# Patient Record
Sex: FEMALE | Race: WHITE | NOT HISPANIC OR LATINO | Employment: OTHER
[De-identification: names, ages, dates, MRNs, and addresses within clinical notes are randomized per-mention and may not be internally consistent; named-entity substitution may affect disease eponyms.]

---

## 2019-12-03 ENCOUNTER — RECORDS - HEALTHEAST (OUTPATIENT)
Dept: ADMINISTRATIVE | Facility: OTHER | Age: 59
End: 2019-12-03

## 2019-12-12 ENCOUNTER — RECORDS - HEALTHEAST (OUTPATIENT)
Dept: BONE DENSITY | Facility: CLINIC | Age: 59
End: 2019-12-12

## 2019-12-12 ENCOUNTER — RECORDS - HEALTHEAST (OUTPATIENT)
Dept: MAMMOGRAPHY | Facility: CLINIC | Age: 59
End: 2019-12-12

## 2019-12-12 ENCOUNTER — RECORDS - HEALTHEAST (OUTPATIENT)
Dept: ADMINISTRATIVE | Facility: OTHER | Age: 59
End: 2019-12-12

## 2019-12-12 DIAGNOSIS — Z78.0 ASYMPTOMATIC MENOPAUSAL STATE: ICD-10-CM

## 2019-12-12 DIAGNOSIS — Z12.31 ENCOUNTER FOR SCREENING MAMMOGRAM FOR MALIGNANT NEOPLASM OF BREAST: ICD-10-CM

## 2019-12-17 ENCOUNTER — RECORDS - HEALTHEAST (OUTPATIENT)
Dept: RADIOLOGY | Facility: CLINIC | Age: 59
End: 2019-12-17

## 2023-06-03 ENCOUNTER — APPOINTMENT (OUTPATIENT)
Dept: ULTRASOUND IMAGING | Facility: HOSPITAL | Age: 63
End: 2023-06-03
Attending: INTERNAL MEDICINE
Payer: COMMERCIAL

## 2023-06-03 ENCOUNTER — HOSPITAL ENCOUNTER (INPATIENT)
Facility: HOSPITAL | Age: 63
LOS: 2 days | Discharge: HOME | End: 2023-06-05
Attending: EMERGENCY MEDICINE | Admitting: INTERNAL MEDICINE
Payer: COMMERCIAL

## 2023-06-03 DIAGNOSIS — N61.0 CELLULITIS OF RIGHT BREAST: ICD-10-CM

## 2023-06-03 LAB
ANION GAP SERPL CALCULATED.3IONS-SCNC: 10 MMOL/L (ref 7–15)
BASOPHILS # BLD AUTO: 0 10E3/UL (ref 0–0.2)
BASOPHILS NFR BLD AUTO: 1 %
BUN SERPL-MCNC: 13.4 MG/DL (ref 8–23)
CALCIUM SERPL-MCNC: 9.5 MG/DL (ref 8.8–10.2)
CHLORIDE SERPL-SCNC: 101 MMOL/L (ref 98–107)
CREAT SERPL-MCNC: 0.56 MG/DL (ref 0.51–0.95)
DEPRECATED HCO3 PLAS-SCNC: 28 MMOL/L (ref 22–29)
EOSINOPHIL # BLD AUTO: 0 10E3/UL (ref 0–0.7)
EOSINOPHIL NFR BLD AUTO: 1 %
ERYTHROCYTE [DISTWIDTH] IN BLOOD BY AUTOMATED COUNT: 13.1 % (ref 10–15)
GFR SERPL CREATININE-BSD FRML MDRD: >90 ML/MIN/1.73M2
GLUCOSE SERPL-MCNC: 96 MG/DL (ref 70–99)
HCT VFR BLD AUTO: 34.8 % (ref 35–47)
HGB BLD-MCNC: 11.6 G/DL (ref 11.7–15.7)
IMM GRANULOCYTES # BLD: 0 10E3/UL
IMM GRANULOCYTES NFR BLD: 0 %
LACTATE SERPL-SCNC: 1.3 MMOL/L (ref 0.7–2)
LYMPHOCYTES # BLD AUTO: 0.9 10E3/UL (ref 0.8–5.3)
LYMPHOCYTES NFR BLD AUTO: 20 %
MAGNESIUM SERPL-MCNC: 2 MG/DL (ref 1.7–2.3)
MCH RBC QN AUTO: 31.8 PG (ref 26.5–33)
MCHC RBC AUTO-ENTMCNC: 33.3 G/DL (ref 31.5–36.5)
MCV RBC AUTO: 95 FL (ref 78–100)
MONOCYTES # BLD AUTO: 0.2 10E3/UL (ref 0–1.3)
MONOCYTES NFR BLD AUTO: 5 %
NEUTROPHILS # BLD AUTO: 3.2 10E3/UL (ref 1.6–8.3)
NEUTROPHILS NFR BLD AUTO: 73 %
NRBC # BLD AUTO: 0 10E3/UL
NRBC BLD AUTO-RTO: 0 /100
PLATELET # BLD AUTO: 238 10E3/UL (ref 150–450)
POTASSIUM SERPL-SCNC: 4.2 MMOL/L (ref 3.4–5.3)
RBC # BLD AUTO: 3.65 10E6/UL (ref 3.8–5.2)
SODIUM SERPL-SCNC: 139 MMOL/L (ref 136–145)
WBC # BLD AUTO: 4.4 10E3/UL (ref 4–11)

## 2023-06-03 PROCEDURE — 85014 HEMATOCRIT: CPT | Performed by: EMERGENCY MEDICINE

## 2023-06-03 PROCEDURE — G0378 HOSPITAL OBSERVATION PER HR: HCPCS

## 2023-06-03 PROCEDURE — 120N000001 HC R&B MED SURG/OB

## 2023-06-03 PROCEDURE — 250N000011 HC RX IP 250 OP 636: Performed by: EMERGENCY MEDICINE

## 2023-06-03 PROCEDURE — 96375 TX/PRO/DX INJ NEW DRUG ADDON: CPT

## 2023-06-03 PROCEDURE — 96374 THER/PROPH/DIAG INJ IV PUSH: CPT

## 2023-06-03 PROCEDURE — 80048 BASIC METABOLIC PNL TOTAL CA: CPT | Performed by: EMERGENCY MEDICINE

## 2023-06-03 PROCEDURE — 258N000003 HC RX IP 258 OP 636: Performed by: EMERGENCY MEDICINE

## 2023-06-03 PROCEDURE — 83735 ASSAY OF MAGNESIUM: CPT | Performed by: EMERGENCY MEDICINE

## 2023-06-03 PROCEDURE — 76641 ULTRASOUND BREAST COMPLETE: CPT | Mod: RT

## 2023-06-03 PROCEDURE — 87040 BLOOD CULTURE FOR BACTERIA: CPT | Performed by: EMERGENCY MEDICINE

## 2023-06-03 PROCEDURE — 83605 ASSAY OF LACTIC ACID: CPT | Performed by: EMERGENCY MEDICINE

## 2023-06-03 PROCEDURE — 250N000013 HC RX MED GY IP 250 OP 250 PS 637: Performed by: INTERNAL MEDICINE

## 2023-06-03 PROCEDURE — 99222 1ST HOSP IP/OBS MODERATE 55: CPT | Performed by: INTERNAL MEDICINE

## 2023-06-03 PROCEDURE — 96376 TX/PRO/DX INJ SAME DRUG ADON: CPT

## 2023-06-03 PROCEDURE — 99285 EMERGENCY DEPT VISIT HI MDM: CPT | Mod: 25

## 2023-06-03 PROCEDURE — 36415 COLL VENOUS BLD VENIPUNCTURE: CPT | Performed by: EMERGENCY MEDICINE

## 2023-06-03 PROCEDURE — 250N000011 HC RX IP 250 OP 636: Performed by: INTERNAL MEDICINE

## 2023-06-03 RX ORDER — SIMVASTATIN 10 MG
40 TABLET ORAL AT BEDTIME
Status: DISCONTINUED | OUTPATIENT
Start: 2023-06-03 | End: 2023-06-05 | Stop reason: HOSPADM

## 2023-06-03 RX ORDER — ONDANSETRON 4 MG/1
4 TABLET, FILM COATED ORAL EVERY 6 HOURS PRN
COMMUNITY
Start: 2023-02-06

## 2023-06-03 RX ORDER — IBUPROFEN 200 MG
400 TABLET ORAL EVERY 6 HOURS PRN
COMMUNITY
Start: 2022-06-03

## 2023-06-03 RX ORDER — CEFAZOLIN SODIUM 1 G/50ML
1250 SOLUTION INTRAVENOUS ONCE
Status: COMPLETED | OUTPATIENT
Start: 2023-06-03 | End: 2023-06-03

## 2023-06-03 RX ORDER — LIDOCAINE 40 MG/G
CREAM TOPICAL
Status: DISCONTINUED | OUTPATIENT
Start: 2023-06-03 | End: 2023-06-05 | Stop reason: HOSPADM

## 2023-06-03 RX ORDER — PIPERACILLIN SODIUM, TAZOBACTAM SODIUM 3; .375 G/15ML; G/15ML
3.38 INJECTION, POWDER, LYOPHILIZED, FOR SOLUTION INTRAVENOUS ONCE
Status: COMPLETED | OUTPATIENT
Start: 2023-06-03 | End: 2023-06-03

## 2023-06-03 RX ORDER — AMOXICILLIN 250 MG
2 CAPSULE ORAL 2 TIMES DAILY PRN
Status: DISCONTINUED | OUTPATIENT
Start: 2023-06-03 | End: 2023-06-05 | Stop reason: HOSPADM

## 2023-06-03 RX ORDER — PIPERACILLIN SODIUM, TAZOBACTAM SODIUM 3; .375 G/15ML; G/15ML
3.38 INJECTION, POWDER, LYOPHILIZED, FOR SOLUTION INTRAVENOUS EVERY 8 HOURS
Status: DISCONTINUED | OUTPATIENT
Start: 2023-06-03 | End: 2023-06-05 | Stop reason: HOSPADM

## 2023-06-03 RX ORDER — ACETAMINOPHEN 325 MG/1
650 TABLET ORAL EVERY 4 HOURS PRN
Status: DISCONTINUED | OUTPATIENT
Start: 2023-06-03 | End: 2023-06-05 | Stop reason: HOSPADM

## 2023-06-03 RX ORDER — VANCOMYCIN HYDROCHLORIDE 1 G/200ML
1000 INJECTION, SOLUTION INTRAVENOUS EVERY 12 HOURS
Status: DISCONTINUED | OUTPATIENT
Start: 2023-06-03 | End: 2023-06-05 | Stop reason: HOSPADM

## 2023-06-03 RX ORDER — ACETAMINOPHEN 500 MG
1000 TABLET ORAL EVERY 6 HOURS PRN
COMMUNITY

## 2023-06-03 RX ORDER — CELECOXIB 100 MG/1
100 CAPSULE ORAL DAILY
COMMUNITY
Start: 2023-03-21

## 2023-06-03 RX ORDER — TRAZODONE HYDROCHLORIDE 50 MG/1
1-2 TABLET, FILM COATED ORAL
COMMUNITY
Start: 2023-02-27

## 2023-06-03 RX ORDER — POLYETHYLENE GLYCOL 3350 17 G/17G
17 POWDER, FOR SOLUTION ORAL DAILY PRN
Status: DISCONTINUED | OUTPATIENT
Start: 2023-06-03 | End: 2023-06-05 | Stop reason: HOSPADM

## 2023-06-03 RX ORDER — SIMVASTATIN 40 MG
40 TABLET ORAL AT BEDTIME
COMMUNITY
Start: 2023-05-04

## 2023-06-03 RX ORDER — CELECOXIB 100 MG/1
100 CAPSULE ORAL DAILY
Status: DISCONTINUED | OUTPATIENT
Start: 2023-06-03 | End: 2023-06-05 | Stop reason: HOSPADM

## 2023-06-03 RX ORDER — TRAZODONE HYDROCHLORIDE 50 MG/1
100 TABLET, FILM COATED ORAL
Status: DISCONTINUED | OUTPATIENT
Start: 2023-06-03 | End: 2023-06-05 | Stop reason: HOSPADM

## 2023-06-03 RX ORDER — AMOXICILLIN 250 MG
1 CAPSULE ORAL 2 TIMES DAILY PRN
Status: DISCONTINUED | OUTPATIENT
Start: 2023-06-03 | End: 2023-06-05 | Stop reason: HOSPADM

## 2023-06-03 RX ADMIN — VANCOMYCIN HYDROCHLORIDE 1250 MG: 5 INJECTION, POWDER, LYOPHILIZED, FOR SOLUTION INTRAVENOUS at 12:29

## 2023-06-03 RX ADMIN — VANCOMYCIN HYDROCHLORIDE 1000 MG: 1 INJECTION, SOLUTION INTRAVENOUS at 23:03

## 2023-06-03 RX ADMIN — ACETAMINOPHEN 650 MG: 325 TABLET ORAL at 13:30

## 2023-06-03 RX ADMIN — PIPERACILLIN AND TAZOBACTAM 3.38 G: 3; .375 INJECTION, POWDER, LYOPHILIZED, FOR SOLUTION INTRAVENOUS at 11:52

## 2023-06-03 RX ADMIN — SIMVASTATIN 40 MG: 40 TABLET, FILM COATED ORAL at 21:36

## 2023-06-03 RX ADMIN — PIPERACILLIN AND TAZOBACTAM 3.38 G: 3; .375 INJECTION, POWDER, LYOPHILIZED, FOR SOLUTION INTRAVENOUS at 18:06

## 2023-06-03 RX ADMIN — CELECOXIB 100 MG: 100 CAPSULE ORAL at 18:06

## 2023-06-03 ASSESSMENT — ACTIVITIES OF DAILY LIVING (ADL)
ADLS_ACUITY_SCORE: 35
ADLS_ACUITY_SCORE: 33
ADLS_ACUITY_SCORE: 35
ADLS_ACUITY_SCORE: 35

## 2023-06-03 NOTE — PHARMACY-ADMISSION MEDICATION HISTORY
Pharmacy Intern Admission Medication History    Admission medication history is complete. The information provided in this note is only as accurate as the sources available at the time of the update.    Medication reconciliation/reorder completed by provider prior to medication history? No    Information Source(s): Patient and CareEverywhere/SureScripts via in-person    Changes made to PTA medication list:    Added: All of list is new    Deleted: None    Changed: None    Medication Affordability:  Not including over the counter (OTC) medications, was there a time in the past 3 months when you did not take your medications as prescribed because of cost?: No    Allergies reviewed with patient and updates made in EHR: yes    Medication History Completed By: Rafael Sanchez 6/3/2023 11:58 AM    Prior to Admission medications    Medication Sig Last Dose Taking? Auth Provider Long Term End Date   acetaminophen (TYLENOL) 500 MG tablet Take 1,000 mg by mouth every 6 hours as needed for pain 6/2/2023 at pm Yes Unknown, Entered By History     amoxicillin-clavulanate (AUGMENTIN) 875-125 MG tablet Take 1 tablet by mouth 2 times daily 6/3/2023 at am Yes Unknown, Entered By History     celecoxib (CELEBREX) 100 MG capsule Take 100 mg by mouth daily 6/1/2023 at am Yes Unknown, Entered By History Yes    ibuprofen (ADVIL/MOTRIN) 200 MG tablet Take 400 mg by mouth every 6 hours as needed 6/2/2023 at pm Yes Unknown, Entered By History     ondansetron (ZOFRAN) 4 MG tablet Take 4 mg by mouth every 6 hours as needed 6/2/2023 at am Yes Unknown, Entered By History     simvastatin (ZOCOR) 40 MG tablet Take 40 mg by mouth At Bedtime 6/1/2023 at pm Yes Unknown, Entered By History Yes    traZODone (DESYREL) 50 MG tablet Take 1-2 tablets by mouth nightly as needed 6/1/2023 at hs Yes Unknown, Entered By History Yes

## 2023-06-03 NOTE — ED PROVIDER NOTES
EMERGENCY DEPARTMENT ENCOUNTER      NAME: Molly Peralta  AGE: 62 year old female  YOB: 1960  MRN: [id number]  EVALUATION DATE & TIME: 6/3/2023 10:22 AM    PCP: Carl Brannon    ED PROVIDER: Taylor Perera M.D.      Chief Complaint   Patient presents with     Generalized Body Aches     Breast Problem     FINAL IMPRESSION:  1. Cellulitis of right breast        ED COURSE & MEDICAL DECISION MAKING:    Pertinent Labs & Imaging studies reviewed. (See chart for details)  ED Course as of 06/03/23 1303   Sat Jun 03, 2023   1029 I reviewed the patient's office visit from yesterday.  She was seen for concern for breast infection and diagnosed with mastitis.  She was started on Augmentin twice a day for 10 days.   1036 Patient is a 62-year-old female who comes in today for evaluation of body aches and progressing erythema from the right breast.  She said a few days ago she had worn a sports bra that was too big and had a lot of shaving around her nipple.  She then developed some erythema to the medial upper right breast and was seen yesterday and started on Augmentin.  She had a total of 3 doses but now is getting erythema spread into her axilla and up into her neck.  She is having fevers and chills.  She is having body aches.  She is definitely having systemic symptoms with this infection so I think this likely represents some sepsis.  We will order vancomycin and Zosyn.  Given that this likely started from shaving I suspect that the organism is likely a skin organism.  I discussed the plan with the patient and she is in agreement.   1150 Spoke with Dr. Polanco with the hospitalist service who agrees with a [facility] inpatient admission for the cellulitis.     10:30 AM I met with and examined the patient.  11:48 AM Spoke with Dr. Villa, Hospitalist, regarding plan for admission.    Medical Decision Making    History:    Supplemental history from:  at bedside    External Record(s) reviewed: Review from  Clinic visit on 6/2/23    Work Up:    Emergent/Severe conditions considered and evaluated for: Breast abscess, cellulitis, sepsis, septic shock    I independently reviewed and interpreted none    In additional to work up documented, I considered the following work up: Considered ultrasound but given that the patient did not have any palpable fluctuance I did not feel it was necessary    Medications given that require intensive monitoring for toxicity: None    External consultation:    Discussion of management with another provider: Hospitalist    Complicating factors:    Care impacted by chronic illness: N/A    Care affected by social determinants of health: N/A    Disposition considerations: Admit    At the conclusion of the encounter I discussed  the results of all of the tests and the disposition with patient.   All questions were answered.  The patient acknowledged understanding and was involved in the decision making regarding the overall care plan.        MEDICATIONS GIVEN IN THE EMERGENCY:  Medications   vancomycin (VANCOCIN) 1,250 mg in sodium chloride 0.9 % 250 mL intermittent infusion (1,250 mg Intravenous $New Bag 6/3/23 1229)   acetaminophen (TYLENOL) tablet 650 mg (has no administration in time range)   oxyCODONE IR (ROXICODONE) half-tab 2.5-5 mg (has no administration in time range)   piperacillin-tazobactam (ZOSYN) 3.375 g vial to attach to  mL bag (has no administration in time range)   vancomycin (VANCOCIN) 1000 mg in dextrose 5% 200 mL PREMIX (has no administration in time range)   celecoxib (celeBREX) capsule 100 mg (has no administration in time range)   simvastatin (ZOCOR) tablet 40 mg (has no administration in time range)   traZODone (DESYREL) tablet 100 mg (has no administration in time range)   lidocaine 1 % 0.1-1 mL (has no administration in time range)   lidocaine (LMX4) cream (has no administration in time range)   sodium chloride (PF) 0.9% PF flush 3 mL (has no administration in  time range)   sodium chloride (PF) 0.9% PF flush 3 mL (has no administration in time range)   melatonin tablet 1 mg (has no administration in time range)   senna-docusate (SENOKOT-S/PERICOLACE) 8.6-50 MG per tablet 1 tablet (has no administration in time range)     Or   senna-docusate (SENOKOT-S/PERICOLACE) 8.6-50 MG per tablet 2 tablet (has no administration in time range)   polyethylene glycol (MIRALAX) Packet 17 g (has no administration in time range)   piperacillin-tazobactam (ZOSYN) 3.375 g vial to attach to  mL bag (3.375 g Intravenous $Given 6/3/23 1395)     =================================================================    HPI    Triage Note: She diagnosed with a breast infection yesterday and started on antibiotics. She feels it is spreading.     Patient information was obtained from: patient     Use of : N/A       Molly Peralta is a 62 year old female who presents to the ED via walk in for evaluation of a right breast infection.     Per chart review, the patient was seen at [facility] on 6/2/2023 for evaluation of a right breast infection. She declined breast imaging and decided to trial an antibiotic. She was started on Augmentin twice daily for 10 days.     The patient reports that she was diagnosed with a right breast infection yesterday and started on Augmentin. She took her third dose this morning. She believes the infection was caused from wearing an ill-fitting bra as she was gardening a few days ago. The patient states that the infection has spread to her back and neck. Also endorses a fever with Tmax 103, a headache, chills, and body aches. The patient has been taking acetaminophen and ibuprofen, but these have provided no relief.    She denies pain anywhere other than where the initial chaffing on her right breast occurred. No other concerns reported at this time.    PAST MEDICAL HISTORY:  History reviewed. No pertinent past medical history.    PAST  SURGICAL HISTORY:  History reviewed. No pertinent surgical history.    CURRENT MEDICATIONS:      Current Facility-Administered Medications:      acetaminophen (TYLENOL) tablet 650 mg, 650 mg, Oral, Q4H PRN, Dania Villa MD     celecoxib (celeBREX) capsule 100 mg, 100 mg, Oral, Daily, Dania Villa MD     lidocaine (LMX4) cream, , Topical, Q1H PRN, Dania Villa MD     lidocaine 1 % 0.1-1 mL, 0.1-1 mL, Other, Q1H PRN, Dania Villa MD     melatonin tablet 1 mg, 1 mg, Oral, At Bedtime PRN, Dania Villa MD     oxyCODONE IR (ROXICODONE) half-tab 2.5-5 mg, 2.5-5 mg, Oral, Q4H PRN, Dania Villa MD     piperacillin-tazobactam (ZOSYN) 3.375 g vial to attach to  mL bag, 3.375 g, Intravenous, Q8H, Dania Villa MD     polyethylene glycol (MIRALAX) Packet 17 g, 17 g, Oral, Daily PRN, Dania Villa MD     senna-docusate (SENOKOT-S/PERICOLACE) 8.6-50 MG per tablet 1 tablet, 1 tablet, Oral, BID PRN **OR** senna-docusate (SENOKOT-S/PERICOLACE) 8.6-50 MG per tablet 2 tablet, 2 tablet, Oral, BID PRN, Dania Villa MD     simvastatin (ZOCOR) tablet 40 mg, 40 mg, Oral, At Bedtime, Dania Villa MD     sodium chloride (PF) 0.9% PF flush 3 mL, 3 mL, Intracatheter, Q8H, Dania Villa MD     sodium chloride (PF) 0.9% PF flush 3 mL, 3 mL, Intracatheter, q1 min prn, Dania Villa MD     traZODone (DESYREL) tablet 100 mg, 100 mg, Oral, At Bedtime PRN, Dania Villa MD     vancomycin (VANCOCIN) 1,250 mg in sodium chloride 0.9 % 250 mL intermittent infusion, 1,250 mg, Intravenous, Once, Taylor Perera MD, 1,250 mg at 06/03/23 1229     vancomycin (VANCOCIN) 1000 mg in dextrose 5% 200 mL PREMIX, 1,000 mg, Intravenous, Q12H, Dania Villa MD    Current Outpatient Medications:      acetaminophen (TYLENOL) 500 MG tablet, Take 1,000 mg by mouth every 6 hours as needed for pain, Disp: , Rfl:      amoxicillin-clavulanate (AUGMENTIN) 875-125 MG tablet, Take 1 tablet by mouth 2 times daily X 10 days, Disp: , Rfl:       "celecoxib (CELEBREX) 100 MG capsule, Take 100 mg by mouth daily, Disp: , Rfl:      ibuprofen (ADVIL/MOTRIN) 200 MG tablet, Take 400 mg by mouth every 6 hours as needed, Disp: , Rfl:      ondansetron (ZOFRAN) 4 MG tablet, Take 4 mg by mouth every 6 hours as needed, Disp: , Rfl:      simvastatin (ZOCOR) 40 MG tablet, Take 40 mg by mouth At Bedtime, Disp: , Rfl:      traZODone (DESYREL) 50 MG tablet, Take 1-2 tablets by mouth nightly as needed, Disp: , Rfl:     ALLERGIES:  No Known Allergies    FAMILY HISTORY:  History reviewed. No pertinent family history.    SOCIAL HISTORY:   Social History     Socioeconomic History     Marital status:        PHYSICAL EXAM    VITAL SIGNS: BP (!) 146/69   Pulse 67   Temp 98.2  F (36.8  C) (Oral)   Resp 12   Ht 1.651 m (5' 5\")   Wt 55 kg (121 lb 4.1 oz)   SpO2 97%   BMI 20.18 kg/m     GENERAL: Awake, alert, answering questions appropriately.   SPEECH:  Easy to understand speech, Normal volume and randee  PULMONARY: No respiratory distress, Lungs clear to auscultation bilaterally  CARDIOVASCULAR: Regular rate and rhythm, Distal pulses present and normal.  ABDOMINAL: Soft, Nondistended, Nontender, No rebound or guarding, No palpable masses  EXTREMITIES: No lower extremity edema. Medial upper quadrant erythema of the right breast.  Tenderness to the area and tenderness to the nipple.  No palpable fluctuant mass.  Erythema into the right axilla up into right neck.  PSYCH: Normal mood and affect     LAB:  All pertinent labs reviewed and interpreted.  Results for orders placed or performed during the hospital encounter of 06/03/23   Basic metabolic panel   Result Value Ref Range    Sodium 139 136 - 145 mmol/L    Potassium 4.2 3.4 - 5.3 mmol/L    Chloride 101 98 - 107 mmol/L    Carbon Dioxide (CO2) 28 22 - 29 mmol/L    Anion Gap 10 7 - 15 mmol/L    Urea Nitrogen 13.4 8.0 - 23.0 mg/dL    Creatinine 0.56 0.51 - 0.95 mg/dL    Calcium 9.5 8.8 - 10.2 mg/dL    Glucose 96 70 - 99 " mg/dL    GFR Estimate >90 >60 mL/min/1.73m2   Lactic acid whole blood   Result Value Ref Range    Lactic Acid 1.3 0.7 - 2.0 mmol/L   Result Value Ref Range    Magnesium 2.0 1.7 - 2.3 mg/dL   CBC with platelets and differential   Result Value Ref Range    WBC Count 4.4 4.0 - 11.0 10e3/uL    RBC Count 3.65 (L) 3.80 - 5.20 10e6/uL    Hemoglobin 11.6 (L) 11.7 - 15.7 g/dL    Hematocrit 34.8 (L) 35.0 - 47.0 %    MCV 95 78 - 100 fL    MCH 31.8 26.5 - 33.0 pg    MCHC 33.3 31.5 - 36.5 g/dL    RDW 13.1 10.0 - 15.0 %    Platelet Count 238 150 - 450 10e3/uL    % Neutrophils 73 %    % Lymphocytes 20 %    % Monocytes 5 %    % Eosinophils 1 %    % Basophils 1 %    % Immature Granulocytes 0 %    NRBCs per 100 WBC 0 <1 /100    Absolute Neutrophils 3.2 1.6 - 8.3 10e3/uL    Absolute Lymphocytes 0.9 0.8 - 5.3 10e3/uL    Absolute Monocytes 0.2 0.0 - 1.3 10e3/uL    Absolute Eosinophils 0.0 0.0 - 0.7 10e3/uL    Absolute Basophils 0.0 0.0 - 0.2 10e3/uL    Absolute Immature Granulocytes 0.0 <=0.4 10e3/uL    Absolute NRBCs 0.0 10e3/uL       I, Dangelo Reyes , am serving as a scribe to document services personally performed by Dr. Perera based on my observation and the provider's statements to me. I, Taylor Perera MD attest that Dangelo Reyes is acting in a scribe capacity, has observed my performance of the services and has documented them in accordance with my direction.    Taylor Perera M.D.  Emergency Medicine  [facility] EMERGENCY DEPARTMENT  [facility] [facility] [address] [phone]  Dept: [phone]     Taylor Perera MD  06/03/23 0510

## 2023-06-03 NOTE — ED TRIAGE NOTES
She diagnosed with a breast infection yesterday and started on antibiotics. She feels it is spreading.

## 2023-06-03 NOTE — PHARMACY-VANCOMYCIN DOSING SERVICE
"Pharmacy Vancomycin Initial Note  Date of Service Shweta 3, 2023  Patient's  1960  62 year old, female    Indication: Skin and Soft Tissue Infection    Current estimated CrCl = Estimated Creatinine Clearance: 88.1 mL/min (based on SCr of 0.56 mg/dL).    Creatinine for last 3 days  6/3/2023: 10:55 AM Creatinine 0.56 mg/dL    Recent Vancomycin Level(s) for last 3 days  No results found for requested labs within last 3 days.      Vancomycin IV Administrations (past 72 hours)                   vancomycin (VANCOCIN) 1,250 mg in sodium chloride 0.9 % 250 mL intermittent infusion (mg) 1,250 mg New Bag 23 1229                Nephrotoxins and other renal medications (From now, onward)    Start     Dose/Rate Route Frequency Ordered Stop    23 2300  vancomycin (VANCOCIN) 1,250 mg in sodium chloride 0.9 % 250 mL intermittent infusion         1,250 mg  over 90 Minutes Intravenous EVERY 12 HOURS 23 1244      23 1800  piperacillin-tazobactam (ZOSYN) 3.375 g vial to attach to  mL bag        Note to Pharmacy: For [facility], [facility] and [facility]: For Zosyn-naive patients, use the \"Zosyn initial dose + extended infusion\" order panel.    3.375 g  over 240 Minutes Intravenous EVERY 8 HOURS 23 1244      23 1100  vancomycin (VANCOCIN) 1,250 mg in sodium chloride 0.9 % 250 mL intermittent infusion         1,250 mg  over 90 Minutes Intravenous ONCE 23 1050            Contrast Orders - past 72 hours (72h ago, onward)    None          [employer] Prediction of Planned Initial Vancomycin Regimen  Loading dose: N/A  Regimen: 1000 mg IV every 12 hours.  Start time: 00:29 on 2023  Exposure target: AUC24 (range)400-600 mg/L.hr   AUC24,ss: 565 mg/L.hr  Probability of AUC24 > 400: 82 %  Ctrough,ss: 16.9 mg/L  Probability of Ctrough,ss > 20: 37 %  Probability of nephrotoxicity (Lodise SAMUEL ): 13 %        Plan:  1. Start vancomycin  1000 mg IV q12h.   2. Vancomycin monitoring method: AUC  3. Vancomycin " therapeutic monitoring goal: 400-600 mg*h/L  4. Pharmacy will check vancomycin levels as appropriate in 1-3 Days.    5. Serum creatinine levels will be ordered daily for the first week of therapy and at least twice weekly for subsequent weeks.      Khadijah Fowler, PharmD, BCPS 06/03/23 12:48 PM

## 2023-06-03 NOTE — H&P
[facility]    History and Physical - Hospitalist Service       Date of Admission:  6/3/2023    Assessment & Plan      Molly Peralta is a 62 year old female admitted on 6/3/2023 for right breast mastitis and chest wall cellulitis.    Right breast mastitis and chest wall cellulitis: due to mechanical injury of the right breast nipple by rubbing her nipple against an unfitted bra. Has daily fever and spreading erythema into the right inner upper quadrant of her right breast, right upper chest wall, right neck and right axilla. Her WBC is normal at 4.4 on admission. But she has a history of borderline leukopenia. Failed outpatient oral Augmentin treatment.   - Started Vancomycin and Zosyn in ER. Will continue  - Follow up blood culture  - US right breast to rule out abscess  - Symptomatic management.     Hyperlipidemia: Continue PTA statin.    Chronic anemia: On admission, hemoglobin was borderline low at 11.6. Patient is aware and has been taking iron supplement.     History of left knee replacement in Dec 2022: Continue PTA celebrex.       Plan of care was discussed with her  by the bedside.        Diet:  Regular diet  DVT Prophylaxis: Low Risk/Ambulatory with no VTE prophylaxis indicated  Mullins Catheter: Not present  Lines: None     Cardiac Monitoring: None  Code Status:  Full code    Clinically Significant Risk Factors Present on Admission                                Disposition Plan           Dania Villa MD  Hospitalist Service  [facility]  Securely message with [employer] (more info)  Text page via [employer] Paging/Directory     ______________________________________________________________________    Chief Complaint   Progressive worsening right breast erythema    History is obtained from the patient    History of Present Illness   Molly Peralta is a 62 year old female with a medical history of hyperlipidemia presented with progressive right breast  erythema for one week. Patient reports that she wore a sport bra one week ago when she did gardening. Since her left knee replacement in Dec 2022, she has lost 15 lbs so that the sport bra does not fit her as well as in the past. The nibble of her right breast rubbed against the bra extensively. The next day, she noticed that the right nipple became red and swollen. She thought that was completely mechanical injury and did not pay attention. Within the next few days, the erythema and swelling of her nipple did not improve. She further developed daily fever with temperature up to 103 with chills and generalized body ache. She had a clinic visit yesterday. She was diagnosed with mastitis and prescribed Augmentin. This morning, she noticed that the erythema had spread from her right nipple to the right inner upper quadrant of her right breast, up to the chest wall and into her right neck. The erythema also spreads to her right axilla. After taking tylenol, she continued to have a temperature of 100.2. She does not feel much pain from her breast. She then decided to come to ER for further evaluation. In ER, she was started on vancomycin and Zosyn. Patient reports that her last mammogram was one year ago and it was normal. Patient reports no chest pain, SOB, nausea, vomiting and abdominal pain.       Past Medical History    Hyperlipidemia    Past Surgical History   Left knee replacement in Dec 2022.     Prior to Admission Medications   None        Review of Systems    The 10 point Review of Systems is negative other than noted in the HPI or here.      Physical Exam   Vital Signs: Temp: 98.2  F (36.8  C) Temp src: Oral BP: 139/68 Pulse: 68   Resp: 12 SpO2: 100 % O2 Device: None (Room air)    Weight: 121 lbs 4.05 oz    General appearance: not in acute distress  HEENT: PERRL, EOMI  Lungs: Clear breath sounds in bilateral lung fields  Cardiovascular: Regular rate and rhythm, normal S1-S2  Abdomen: Soft, non tender, no  distension  Musculoskeletal: No joint swelling  Right breast: the nipple is red and swelling. There is erythema of the skin in inner upper quadrant, right upper chest and lower right neck. The skin in right axilla also has erythema. No enlarged axillary lymphnodes palpable.   Left breast: appear normal without swelling and erythema.  Neurology: AAO ×3.  Cranial nerves II - XII normal.  Normal muscle strength in all four extremities.    Medical Decision Making       55 minutes spend by me on the date of service doing chart review, history, exam, documentation & further activities per the note.     Data     I have personally reviewed the following data over the past 24 hrs:    4.4  \   11.6 (L)   / 238     139 101 13.4 /  96   4.2 28 0.56 \       Procal: N/A CRP: N/A Lactic Acid: 1.3

## 2023-06-03 NOTE — PHARMACY-CONSULT NOTE
"Pharmacy Vancomycin Initial Note  Date of Service Shweta 3, 2023  Patient's  1960  62 year old, female    Indication: Skin and Soft Tissue Infection    Current estimated CrCl = CrCl cannot be calculated (No successful lab value found.).    Creatinine for last 3 days  No results found for requested labs within last 3 days.    Recent Vancomycin Level(s) for last 3 days  No results found for requested labs within last 3 days.      Vancomycin IV Administrations (past 72 hours)      No vancomycin orders with administrations in past 72 hours.                Nephrotoxins and other renal medications (From now, onward)    Start     Dose/Rate Route Frequency Ordered Stop    23 1100  piperacillin-tazobactam (ZOSYN) 3.375 g vial to attach to  mL bag        Note to Pharmacy: For [facility], [facility] and [facility]: For Zosyn-naive patients, use the \"Zosyn initial dose + extended infusion\" order panel.    3.375 g  over 30 Minutes Intravenous ONCE 23 1034      23 1100  vancomycin (VANCOCIN) 1,250 mg in sodium chloride 0.9 % 250 mL intermittent infusion         1,250 mg  over 32379 Minutes Intravenous ONCE 23 1050            Contrast Orders - past 72 hours (72h ago, onward)    None                  Plan:  1. Start vancomycin 1250 mg IV once in emergency department.  2. Please re-consult pharmacy if continued as an inpatient.    Aamir Giron [facility]  "

## 2023-06-04 LAB
CREAT SERPL-MCNC: 0.64 MG/DL (ref 0.51–0.95)
ERYTHROCYTE [DISTWIDTH] IN BLOOD BY AUTOMATED COUNT: 13.1 % (ref 10–15)
GFR SERPL CREATININE-BSD FRML MDRD: >90 ML/MIN/1.73M2
HCT VFR BLD AUTO: 36.8 % (ref 35–47)
HGB BLD-MCNC: 11.9 G/DL (ref 11.7–15.7)
HOLD SPECIMEN: NORMAL
MCH RBC QN AUTO: 31.8 PG (ref 26.5–33)
MCHC RBC AUTO-ENTMCNC: 32.3 G/DL (ref 31.5–36.5)
MCV RBC AUTO: 98 FL (ref 78–100)
MRSA DNA SPEC QL NAA+PROBE: NEGATIVE
PLATELET # BLD AUTO: 267 10E3/UL (ref 150–450)
RBC # BLD AUTO: 3.74 10E6/UL (ref 3.8–5.2)
SA TARGET DNA: NEGATIVE
WBC # BLD AUTO: 3.3 10E3/UL (ref 4–11)

## 2023-06-04 PROCEDURE — 250N000011 HC RX IP 250 OP 636: Performed by: INTERNAL MEDICINE

## 2023-06-04 PROCEDURE — 120N000001 HC R&B MED SURG/OB

## 2023-06-04 PROCEDURE — 85027 COMPLETE CBC AUTOMATED: CPT | Performed by: INTERNAL MEDICINE

## 2023-06-04 PROCEDURE — 250N000013 HC RX MED GY IP 250 OP 250 PS 637: Performed by: INTERNAL MEDICINE

## 2023-06-04 PROCEDURE — 96376 TX/PRO/DX INJ SAME DRUG ADON: CPT

## 2023-06-04 PROCEDURE — 87641 MR-STAPH DNA AMP PROBE: CPT | Performed by: INTERNAL MEDICINE

## 2023-06-04 PROCEDURE — 36415 COLL VENOUS BLD VENIPUNCTURE: CPT | Performed by: INTERNAL MEDICINE

## 2023-06-04 PROCEDURE — 82565 ASSAY OF CREATININE: CPT | Performed by: INTERNAL MEDICINE

## 2023-06-04 PROCEDURE — 99207 PR CDG-CUT & PASTE-POTENTIAL IMPACT ON LEVEL: CPT | Performed by: INTERNAL MEDICINE

## 2023-06-04 PROCEDURE — G0378 HOSPITAL OBSERVATION PER HR: HCPCS

## 2023-06-04 PROCEDURE — 99232 SBSQ HOSP IP/OBS MODERATE 35: CPT | Performed by: INTERNAL MEDICINE

## 2023-06-04 RX ADMIN — PIPERACILLIN AND TAZOBACTAM 3.38 G: 3; .375 INJECTION, POWDER, LYOPHILIZED, FOR SOLUTION INTRAVENOUS at 02:26

## 2023-06-04 RX ADMIN — Medication 1 MG: at 22:46

## 2023-06-04 RX ADMIN — CELECOXIB 100 MG: 100 CAPSULE ORAL at 08:19

## 2023-06-04 RX ADMIN — VANCOMYCIN HYDROCHLORIDE 1000 MG: 1 INJECTION, SOLUTION INTRAVENOUS at 22:41

## 2023-06-04 RX ADMIN — PIPERACILLIN AND TAZOBACTAM 3.38 G: 3; .375 INJECTION, POWDER, LYOPHILIZED, FOR SOLUTION INTRAVENOUS at 17:55

## 2023-06-04 RX ADMIN — VANCOMYCIN HYDROCHLORIDE 1000 MG: 1 INJECTION, SOLUTION INTRAVENOUS at 11:44

## 2023-06-04 RX ADMIN — PIPERACILLIN AND TAZOBACTAM 3.38 G: 3; .375 INJECTION, POWDER, LYOPHILIZED, FOR SOLUTION INTRAVENOUS at 10:33

## 2023-06-04 RX ADMIN — SIMVASTATIN 40 MG: 40 TABLET, FILM COATED ORAL at 21:32

## 2023-06-04 RX ADMIN — ACETAMINOPHEN 650 MG: 325 TABLET ORAL at 08:19

## 2023-06-04 ASSESSMENT — ACTIVITIES OF DAILY LIVING (ADL)
ADLS_ACUITY_SCORE: 35

## 2023-06-04 NOTE — PROGRESS NOTES
[facility]    Medicine Progress Note - Hospitalist Service    Date of Admission:  6/3/2023    Assessment & Plan   Molly Peralta is a 62 year old female admitted on 6/3/2023 for right breast mastitis and chest wall cellulitis.     Right breast mastitis and chest wall cellulitis: due to mechanical injury of the right breast nipple by rubbing her nipple against an unfitted bra. Has daily fever and spreading erythema into the right inner upper quadrant of her right breast, right upper chest wall, right neck and right axilla. Her WBC is normal at 4.4 on admission. But she has a history of borderline leukopenia.   Not sure outpatient oral Augmentin treatment failure as she had only 3 tabs of ABX before presenting to ED  --Breast ultrasound negative for abscess or mass  - Started Vancomycin and Zosyn in ED, continue as significant improvement on her cellulitis  --BC so far no growth  --MRSA screen ordered    Hyperlipidemia: Continue PTA statin.     Chronic anemia: On admission, hemoglobin was borderline low at 11.6. Patient is aware and has been taking iron supplement.     Mild leukopenia; seems chronic, CBC with differential in a.m.     History of left knee replacement in Dec 2022: Continue PTA celebrex.            Diet: Regular Diet Adult    DVT Prophylaxis: Ambulate every shift  Mullins Catheter: Not present  Lines: None     Cardiac Monitoring: None  Code Status: Full Code      Clinically Significant Risk Factors Present on Admission                                Disposition Plan     Expected Discharge Date: 06/05/2023                  Kana GUTHRIE MD  Hospitalist Service  [facility]  Securely message with [employer] (more info)  Text page via [employer] Paging/Directory   ______________________________________________________________________    Interval History   Patient is new to me.  Patient seen and examined.  Notes, labs, imaging report personally reviewed.  No acute issues  reported overnight.  Patient showed me a picture of breast cellulitis, which is spreading to her front chest up to her right neck and now seems significantly better.  Reported did not feel feverish overnight.  No short of breath cough or congestion.  Denied abdomen pain, nausea, vomiting.  Discussed with nursing staffs at bedside.    Physical Exam   Vital Signs: Temp: 98.6  F (37  C) Temp src: Oral BP: 135/69 Pulse: 66   Resp: 18 SpO2: 100 % O2 Device: None (Room air)    Weight: 121 lbs 4.05 oz      General: Not in obvious distress.  HEENT: Normocephalic, supple neck  Chest: Clear to auscultation bilateral anteriorly, no wheezing  Heart: S1S2 normal, regular  Abdomen: Soft. NT, ND. Bowel sounds- active.  Extremities: No legs swelling  Neuro: alert and awake, grossly non-focal  Skin; right breast cellulitis much better        Medical Decision Making             Data     I have personally reviewed the following data over the past 24 hrs:    3.3 (L)  \   11.9   / 267     N/A N/A N/A /  N/A   N/A N/A 0.64 \       Imaging results reviewed over the past 24 hrs:   Recent Results (from the past 24 hour(s))   US Breast Right Complete 4 Quadrants    Narrative    EXAM: ULTRASOUND BREAST UNILATERAL RIGHT LIMITED  LOCATION: [facility]  DATE/TIME: 06/03/2023, 2:15 PM CDT    INDICATION: Admitted for mastitis for one week with fever and spreading erythema. Rule out breast abscess.    COMPARISON: Bilateral screening mammography with tomosynthesis 12/12/2019.    RIGHT BREAST ULTRASOUND FINDINGS: No focal mass or abscess identified.      Impression    IMPRESSION:     ACR BI-RADS Category 1: Negative.    Recommend diagnostic mammography as an outpatient. Recommend clinical follow-up to ensure complete resolution of mastitis.

## 2023-06-04 NOTE — PROGRESS NOTES
Received report at 1930. Pt transferring to P4 room 432 now @2030. Report given to charge RN. Pt does not need anything for pain right now.

## 2023-06-04 NOTE — PLAN OF CARE
Problem: Plan of Care - These are the overarching goals to be used throughout the patient stay.    Goal: Absence of Hospital-Acquired Illness or Injury  Intervention: Identify and Manage Fall Risk  Recent Flowsheet Documentation  Taken 6/4/2023 0810 by Shama Lazo RN  Safety Promotion/Fall Prevention:   clutter free environment maintained   nonskid shoes/slippers when out of bed     Problem: Plan of Care - These are the overarching goals to be used throughout the patient stay.    Goal: Absence of Hospital-Acquired Illness or Injury  Intervention: Prevent Skin Injury  Recent Flowsheet Documentation  Taken 6/4/2023 0810 by Shama Lazo RN  Body Position: position changed independently   Goal Outcome Evaluation:       Patient alert and oriented, denied pain. Did complain of HA, she received tylenol  prn for HA which helped. Ambulated in the room and around the nursing unit independently.

## 2023-06-04 NOTE — PLAN OF CARE
Problem: Plan of Care - These are the overarching goals to be used throughout the patient stay.    Goal: Plan of Care Review  Description: The Plan of Care Review/Shift note should be completed every shift.  The Outcome Evaluation is a brief statement about your assessment that the patient is improving, declining, or no change.  This information will be displayed automatically on your shift note.  Outcome: Progressing     Problem: Infection  Goal: Absence of Infection Signs and Symptoms  Outcome: Progressing   Goal Outcome Evaluation:       Pt independent in room. Denies any pain or discomfort. IV abx (Vancomycin and Zosyn) given as scheduled. On room air, VSS.

## 2023-06-04 NOTE — PROGRESS NOTES
Chart reviewed, independent and admit for IV antibiotic for cellulitis of right breast. No CM needs unless need IV antbiotics at discharge.

## 2023-06-05 VITALS
DIASTOLIC BLOOD PRESSURE: 70 MMHG | BODY MASS INDEX: 20.2 KG/M2 | TEMPERATURE: 98.9 F | OXYGEN SATURATION: 96 % | HEART RATE: 70 BPM | SYSTOLIC BLOOD PRESSURE: 125 MMHG | RESPIRATION RATE: 20 BRPM | HEIGHT: 65 IN | WEIGHT: 121.25 LBS

## 2023-06-05 LAB
BASOPHILS # BLD AUTO: 0 10E3/UL (ref 0–0.2)
BASOPHILS NFR BLD AUTO: 1 %
CREAT SERPL-MCNC: 0.75 MG/DL (ref 0.51–0.95)
EOSINOPHIL # BLD AUTO: 0.2 10E3/UL (ref 0–0.7)
EOSINOPHIL NFR BLD AUTO: 4 %
ERYTHROCYTE [DISTWIDTH] IN BLOOD BY AUTOMATED COUNT: 12.7 % (ref 10–15)
GFR SERPL CREATININE-BSD FRML MDRD: 88 ML/MIN/1.73M2
HCT VFR BLD AUTO: 35.8 % (ref 35–47)
HGB BLD-MCNC: 11.8 G/DL (ref 11.7–15.7)
IMM GRANULOCYTES # BLD: 0 10E3/UL
IMM GRANULOCYTES NFR BLD: 0 %
LYMPHOCYTES # BLD AUTO: 1.4 10E3/UL (ref 0.8–5.3)
LYMPHOCYTES NFR BLD AUTO: 32 %
MCH RBC QN AUTO: 31.4 PG (ref 26.5–33)
MCHC RBC AUTO-ENTMCNC: 33 G/DL (ref 31.5–36.5)
MCV RBC AUTO: 95 FL (ref 78–100)
MONOCYTES # BLD AUTO: 0.4 10E3/UL (ref 0–1.3)
MONOCYTES NFR BLD AUTO: 9 %
NEUTROPHILS # BLD AUTO: 2.3 10E3/UL (ref 1.6–8.3)
NEUTROPHILS NFR BLD AUTO: 54 %
NRBC # BLD AUTO: 0 10E3/UL
NRBC BLD AUTO-RTO: 0 /100
PLATELET # BLD AUTO: 306 10E3/UL (ref 150–450)
RBC # BLD AUTO: 3.76 10E6/UL (ref 3.8–5.2)
WBC # BLD AUTO: 4.4 10E3/UL (ref 4–11)

## 2023-06-05 PROCEDURE — 250N000013 HC RX MED GY IP 250 OP 250 PS 637: Performed by: INTERNAL MEDICINE

## 2023-06-05 PROCEDURE — 250N000011 HC RX IP 250 OP 636: Performed by: INTERNAL MEDICINE

## 2023-06-05 PROCEDURE — 36415 COLL VENOUS BLD VENIPUNCTURE: CPT | Performed by: INTERNAL MEDICINE

## 2023-06-05 PROCEDURE — 99239 HOSP IP/OBS DSCHRG MGMT >30: CPT | Performed by: HOSPITALIST

## 2023-06-05 PROCEDURE — G0378 HOSPITAL OBSERVATION PER HR: HCPCS

## 2023-06-05 PROCEDURE — 85025 COMPLETE CBC W/AUTO DIFF WBC: CPT | Performed by: INTERNAL MEDICINE

## 2023-06-05 PROCEDURE — 82565 ASSAY OF CREATININE: CPT | Performed by: INTERNAL MEDICINE

## 2023-06-05 PROCEDURE — 96376 TX/PRO/DX INJ SAME DRUG ADON: CPT

## 2023-06-05 RX ADMIN — PIPERACILLIN AND TAZOBACTAM 3.38 G: 3; .375 INJECTION, POWDER, LYOPHILIZED, FOR SOLUTION INTRAVENOUS at 02:37

## 2023-06-05 RX ADMIN — CELECOXIB 100 MG: 100 CAPSULE ORAL at 09:14

## 2023-06-05 ASSESSMENT — ACTIVITIES OF DAILY LIVING (ADL)
ADLS_ACUITY_SCORE: 35

## 2023-06-05 NOTE — DISCHARGE SUMMARY
[facility] MEDICINE  DISCHARGE SUMMARY     Primary Care Physician: [facility]  Admission Date: 6/3/2023   Discharge Provider: Swati Moore MD Discharge Date: 6/5/2023   Diet:   Active Diet and Nourishment Order   Procedures     Regular Diet Adult     Diet       Code Status: Full Code   Activity: DCACTIVITY: Activity as tolerated        Condition at Discharge: Good     REASON FOR PRESENTATION(See Admission Note for Details)   cellulitis    PRINCIPAL & ACTIVE DISCHARGE DIAGNOSES     Principal Problem:    Cellulitis of right breast      PENDING LABS     Unresulted Labs Ordered in the Past 30 Days of this Admission     Date and Time Order Name Status Description    6/3/2023 10:34 AM Blood Culture Peripheral Blood Preliminary     6/3/2023 10:34 AM Blood Culture Peripheral Blood Preliminary           PROCEDURES ( this hospitalization only)      none    RECOMMENDATIONS TO OUTPATIENT PROVIDER FOR F/U VISIT     Follow-up Appointments     Follow-up and recommended labs and tests      Follow up with primary care provider within 7 days for hospital follow-   up.  Recommend a mammogram later once symptoms have resolved, maybe in 1-2   months. To be arranged by primary care             DISPOSITION     Home    SUMMARY OF HOSPITAL COURSE:      Molly Peralta is a 62-year-old female with a history of knee replacement and hyperlipidemia who presented for evaluation of erythema of the right chest wall found to have nonlactational mastitis and cellulitis.    #Nonlactational mastitis of right breast  #Cellulitis of right chest wall and neck  Patient with friction injury of her right nipple which progressed to erythema and swelling of the breast.  She had fevers up to 103 F and progressive erythema of the right chest wall extending up into her neck.  She was seen in the ED and started on oral Augmentin, took 3 doses and erythema continued to spread and so she returned for  reevaluation.  She received a few days of broad-spectrum IV antibiotics during her hospital stay.  Erythema has dramatically improved, she has a little bit of erythema and induration of her right nipple remaining but overall is feeling much better.  Blood cultures no growth.  Nasal MRSA/MSSA swab negative (not clear to me if this result valid if MSSA not detected since MSSA is normal srinivasan). We will resume the course of Augmentin that she had initiated at home.  Ultrasound of the breast had been obtained on 6/3 and did not show any abscess.  Patient will follow-up soon with her primary care provider, if residual mastitis is slow to improve may benefit from a slightly longer course of antibiotics up to 14 days.  Radiologist recommended mammogram for further evaluation of changes seen on US and this seems like a prudent idea as not entirely typical presentation. Patient did note she had a recent mammogram last fall and it was normal.    Discharge Medications with Med changes:     Current Discharge Medication List      CONTINUE these medications which have NOT CHANGED    Details   acetaminophen (TYLENOL) 500 MG tablet Take 1,000 mg by mouth every 6 hours as needed for pain      amoxicillin-clavulanate (AUGMENTIN) 875-125 MG tablet Take 1 tablet by mouth 2 times daily X 10 days      celecoxib (CELEBREX) 100 MG capsule Take 100 mg by mouth daily      ibuprofen (ADVIL/MOTRIN) 200 MG tablet Take 400 mg by mouth every 6 hours as needed      ondansetron (ZOFRAN) 4 MG tablet Take 4 mg by mouth every 6 hours as needed      simvastatin (ZOCOR) 40 MG tablet Take 40 mg by mouth At Bedtime      traZODone (DESYREL) 50 MG tablet Take 1-2 tablets by mouth nightly as needed               Consults     PHARMACY TO DOSE VANCO  PHARMACY TO DOSE VANCO      SIGNIFICANT IMAGING FINDINGS     Results for orders placed or performed during the hospital encounter of 06/03/23   US Breast Right Complete 4 Quadrants    Impression    IMPRESSION:      ACR BI-RADS Category 1: Negative.    Recommend diagnostic mammography as an outpatient. Recommend clinical follow-up to ensure complete resolution of mastitis.           SIGNIFICANT LABORATORY FINDINGS     See emr    Discharge Orders        Reason for your hospital stay    cellulitis     Follow-up and recommended labs and tests    Follow up with primary care provider within 7 days for hospital follow- up.  Recommend a mammogram later once symptoms have resolved, maybe in 1-2 months. To be arranged by primary care     Activity    Your activity upon discharge: activity as tolerated     When to contact your care team    Call your primary doctor if you have any of the following: chest pain, shortness of breath, fever, chills, fainting, dizziness, vomiting, constipation, dehydration, worsening pain, or bleeding, or any other symptoms that are new or concerning to you.     Diet    Follow this diet upon discharge: resume your regular diet as tolerated       Examination   Physical Exam   Temp:  [98.5  F (36.9  C)-98.9  F (37.2  C)] 98.9  F (37.2  C)  Pulse:  [70-72] 70  Resp:  [18-20] 20  BP: (122-137)/(65-73) 125/70  SpO2:  [96 %-98 %] 96 %  Wt Readings from Last 1 Encounters:   06/03/23 55 kg (121 lb 4.1 oz)       General: in no apparent distress, non-toxic and alert female sitting on edge of bed oriented x3  HEENT: Head normocephalic atraumatic, oral mucosa moist. Sclerae anicteric  CV: Regular rhythm, normal rate, no murmurs  Resp: No wheezes, no rales or rhonchi, no focal consolidations  GI: Belly soft, nondistended, nontender, bowel sounds present  Skin: slight erythema and thickening of skin of R nipple. Chest wall, neck, arm without erythema  Extremities: No peripheral edema  Psych: Normal affect, mood euthymic  Neuro: CNII-XII grossly intact, moving all 4 extremities    Please see EMR for more detailed significant labs, imaging, consultant notes etc.    ISwati MD, personally saw the patient today and  spent greater than 30 minutes discharging this patient.    Swati Moore MD  [facility]    CC:System, Provider Not In

## 2023-06-05 NOTE — PROGRESS NOTES
Care Management Discharge Note    Discharge Date: 06/05/2023       Discharge Disposition: Home    Discharge Services: None    Discharge DME: None    Discharge Transportation: family or friend will provide    Private pay costs discussed: Not applicable    Does the patient's insurance plan have a 3 day qualifying hospital stay waiver?  No    PAS Confirmation Code: N/A   Patient/family educated on Medicare website which has current facility and service quality ratings: N/A    Education Provided on the Discharge Plan: N/A  Persons Notified of Discharge Plans: N/A  Patient/Family in Agreement with the Plan: N/A    Handoff Referral Completed: Yes    Additional Information:  10:07 AM  Chart reviewed. Plan is for Pt to discharge back home with her spouse. Pt is independent at baseline. Pt is switching to oral antibiotics here at discharge. No CM needs identified during Pt's stay. Family to transport.     SIOBHAN العلي

## 2023-06-05 NOTE — PLAN OF CARE
Problem: Plan of Care - These are the overarching goals to be used throughout the patient stay.    Goal: Plan of Care Review  Description: The Plan of Care Review/Shift note should be completed every shift.  The Outcome Evaluation is a brief statement about your assessment that the patient is improving, declining, or no change.  This information will be displayed automatically on your shift note.  Outcome: Progressing     Problem: Infection  Goal: Absence of Infection Signs and Symptoms  Outcome: Progressing   Goal Outcome Evaluation:       Pt independent in room. IV abx (Vancomycin and zosyn) given. Denies any pain or discomfort. On room air, VSS.

## 2023-06-05 NOTE — PLAN OF CARE
Problem: Plan of Care - These are the overarching goals to be used throughout the patient stay.    Goal: Plan of Care Review  Description: The Plan of Care Review/Shift note should be completed every shift.  The Outcome Evaluation is a brief statement about your assessment that the patient is improving, declining, or no change.  This information will be displayed automatically on your shift note.  Outcome: Adequate for Care Transition   Goal Outcome Evaluation:  Patient is afebrile-neck and ear are slightly pink-breast /nipple area is slightly pink also. Patient denied pain. She has been up and around in room without difficulty. She and her spouse received discharge instructions and questions were answered-she will see her primary MD within the week.

## 2023-06-05 NOTE — PLAN OF CARE
Problem: Infection  Goal: Absence of Infection Signs and Symptoms  Outcome: Progressing     Problem: Pain Acute  Goal: Optimal Pain Control and Function  Outcome: Progressing     Goal Outcome Evaluation:       Pt presented to the hospital on 6/03 with right breast mastitis and chest wall cellulitis with erythema extending from the nipple to the axilla and up the right side of the neck to top of ear. Erythema has significantly subsided at this time. US negative for abscess or mass. Blood cultures showing no growth to date. Pt denies pain. Afebrile. On IV abx in the form of Zosyn and Vancomycin. She is experiencing some symptoms of nausea and diarrhea on the abx. Discussed treatment options however, pt declines anything further at this time. Will continue to monitor. She is tolerating a regular diet. Appetite is fair. Independent with ambulation.

## 2023-06-08 LAB
BACTERIA BLD CULT: NO GROWTH
BACTERIA BLD CULT: NO GROWTH

## 2023-06-15 ENCOUNTER — TELEPHONE (OUTPATIENT)
Dept: MAMMOGRAPHY | Facility: CLINIC | Age: 63
End: 2023-06-15
Payer: COMMERCIAL

## 2023-06-15 NOTE — TELEPHONE ENCOUNTER
6/19/23:  UPDATE:  I have left multiple messages on patient VM and have not heard back from her.   I faxed the imaging report from 6/3/23 noting follow up recommendations to LUKAS Jansen at [facility], where patient was seen for post discharge hospital follow up on 6/9/23. Faxed orders to LUKAS Jansen.        Landy Huntley RN, BSN, PHN, CBCN  [facility]ast [facility] Imaging Nurse Coordinator   [address]  [place] [address]  [phone]          6/15/23:    Per imaging report of right breast ultrasound 6/3/23 done at [facility] per Dr. Navarro:    INDICATION: Admitted for mastitis for one week with fever and spreading erythema. Rule out breast abscess.     ACR BI-RADS Category 1: Negative.  Recommend diagnostic mammography as an outpatient. Recommend clinical follow-up to ensure complete resolution of mastitis.      Called patient to assist with scheduling the recommended follow up imaging listed above.  No answer.  Left message with my direct number for patient to call me back to schedule.      Bilateral Diagnostic Mammograms with Right Breast Ultrasound       Landy Huntley RN, BSN, PHN, CBCN  Breast [facility] Imaging Nurse Coordinator   [address]  [place], [place] [id number] [phone]

## 2023-08-26 ENCOUNTER — HEALTH MAINTENANCE LETTER (OUTPATIENT)
Age: 63
End: 2023-08-26

## 2023-11-04 ENCOUNTER — HEALTH MAINTENANCE LETTER (OUTPATIENT)
Age: 63
End: 2023-11-04

## 2024-01-13 ENCOUNTER — HEALTH MAINTENANCE LETTER (OUTPATIENT)
Age: 64
End: 2024-01-13

## 2025-01-25 ENCOUNTER — HEALTH MAINTENANCE LETTER (OUTPATIENT)
Age: 65
End: 2025-01-25